# Patient Record
Sex: MALE | Race: WHITE
[De-identification: names, ages, dates, MRNs, and addresses within clinical notes are randomized per-mention and may not be internally consistent; named-entity substitution may affect disease eponyms.]

---

## 2021-04-12 LAB
ALBUMIN SERPL BCP-MCNC: 4 G/DL (ref 3.4–5)
ALBUMIN/GLOB SERPL: 1.4 {RATIO} (ref 1.1–1.5)
ALP SERPL-CCNC: 77 IU/L (ref 46–116)
ALT SERPL W P-5'-P-CCNC: 26 U/L (ref 30–65)
ANION GAP SERPL CALCULATED.3IONS-SCNC: 8 MMOL/L (ref 8–16)
AST SERPL W P-5'-P-CCNC: 19 U/L (ref 10–37)
BASOPHILS # BLD AUTO: 0 X10'3 (ref 0–0.2)
BASOPHILS NFR BLD AUTO: 0.3 % (ref 0–1)
BILIRUB SERPL-MCNC: 0.7 MG/DL (ref 0–1)
BUN SERPL-MCNC: 30 MG/DL (ref 7–18)
BUN/CREAT SERPL: 18.2 (ref 5.4–32)
CALCIUM SERPL-MCNC: 8.8 MG/DL (ref 8.5–10.1)
CHLORIDE SERPL-SCNC: 106 MMOL/L (ref 99–107)
CO2 SERPL-SCNC: 24.6 MMOL/L (ref 24–32)
CREAT SERPL-MCNC: 1.65 MG/DL (ref 0.6–1.1)
EOSINOPHIL # BLD AUTO: 0.4 X10'3 (ref 0–0.9)
EOSINOPHIL NFR BLD AUTO: 6.6 % (ref 0–6)
ERYTHROCYTE [DISTWIDTH] IN BLOOD BY AUTOMATED COUNT: 14.5 % (ref 11.5–14.5)
GFR SERPL CREATININE-BSD FRML MDRD: 41 ML/MIN
GLUCOSE SERPL-MCNC: 141 MG/DL (ref 70–104)
HCT VFR BLD AUTO: 43.1 % (ref 42–52)
HGB BLD-MCNC: 14.4 G/DL (ref 14–17.9)
LYMPHOCYTES # BLD AUTO: 0.5 X10'3 (ref 1.1–4.8)
LYMPHOCYTES NFR BLD AUTO: 7.7 % (ref 21–51)
MCH RBC QN AUTO: 31.5 PG (ref 27–31)
MCHC RBC AUTO-ENTMCNC: 33.4 G/DL (ref 33–36.5)
MCV RBC AUTO: 94.3 FL (ref 78–98)
MONOCYTES # BLD AUTO: 0.5 X10'3 (ref 0–0.9)
MONOCYTES NFR BLD AUTO: 7.4 % (ref 2–12)
NEUTROPHILS # BLD AUTO: 5.2 X10'3 (ref 1.8–7.7)
NEUTROPHILS NFR BLD AUTO: 78 % (ref 42–75)
PLATELET # BLD AUTO: 131 X10'3 (ref 140–440)
PMV BLD AUTO: 7.9 FL (ref 7.4–10.4)
POTASSIUM SERPL-SCNC: 4.8 MMOL/L (ref 3.4–5.1)
PROT SERPL-MCNC: 6.9 G/DL (ref 6.4–8.2)
RBC # BLD AUTO: 4.57 X10'6 (ref 4.7–6.1)
SODIUM SERPL-SCNC: 139 MMOL/L (ref 135–145)

## 2021-04-19 ENCOUNTER — HOSPITAL ENCOUNTER (OUTPATIENT)
Dept: HOSPITAL 94 - PAS | Age: 76
Discharge: HOME | End: 2021-04-19
Attending: ORTHOPAEDIC SURGERY
Payer: MEDICARE

## 2021-04-19 VITALS — SYSTOLIC BLOOD PRESSURE: 140 MMHG | DIASTOLIC BLOOD PRESSURE: 91 MMHG

## 2021-04-19 VITALS — DIASTOLIC BLOOD PRESSURE: 82 MMHG | SYSTOLIC BLOOD PRESSURE: 126 MMHG

## 2021-04-19 VITALS — SYSTOLIC BLOOD PRESSURE: 109 MMHG | DIASTOLIC BLOOD PRESSURE: 79 MMHG

## 2021-04-19 VITALS — BODY MASS INDEX: 24.81 KG/M2 | WEIGHT: 177.25 LBS | HEIGHT: 71 IN

## 2021-04-19 VITALS — SYSTOLIC BLOOD PRESSURE: 136 MMHG | DIASTOLIC BLOOD PRESSURE: 90 MMHG

## 2021-04-19 VITALS — SYSTOLIC BLOOD PRESSURE: 116 MMHG | DIASTOLIC BLOOD PRESSURE: 78 MMHG

## 2021-04-19 VITALS — DIASTOLIC BLOOD PRESSURE: 83 MMHG | SYSTOLIC BLOOD PRESSURE: 125 MMHG

## 2021-04-19 DIAGNOSIS — Z72.89: ICD-10-CM

## 2021-04-19 DIAGNOSIS — E78.00: ICD-10-CM

## 2021-04-19 DIAGNOSIS — Z79.82: ICD-10-CM

## 2021-04-19 DIAGNOSIS — Z95.2: ICD-10-CM

## 2021-04-19 DIAGNOSIS — G43.909: ICD-10-CM

## 2021-04-19 DIAGNOSIS — F32.9: ICD-10-CM

## 2021-04-19 DIAGNOSIS — Z20.822: ICD-10-CM

## 2021-04-19 DIAGNOSIS — K21.9: ICD-10-CM

## 2021-04-19 DIAGNOSIS — M67.432: ICD-10-CM

## 2021-04-19 DIAGNOSIS — G56.02: Primary | ICD-10-CM

## 2021-04-19 DIAGNOSIS — Z98.890: ICD-10-CM

## 2021-04-19 DIAGNOSIS — Z79.899: ICD-10-CM

## 2021-04-19 DIAGNOSIS — N40.0: ICD-10-CM

## 2021-04-19 DIAGNOSIS — Z85.46: ICD-10-CM

## 2021-04-19 PROCEDURE — 93005 ELECTROCARDIOGRAM TRACING: CPT

## 2021-04-19 PROCEDURE — 82948 REAGENT STRIP/BLOOD GLUCOSE: CPT

## 2021-04-19 PROCEDURE — 85025 COMPLETE CBC W/AUTO DIFF WBC: CPT

## 2021-04-19 PROCEDURE — 80053 COMPREHEN METABOLIC PANEL: CPT

## 2021-04-19 PROCEDURE — 64721 CARPAL TUNNEL SURGERY: CPT

## 2021-04-19 PROCEDURE — 25111 REMOVE WRIST TENDON LESION: CPT

## 2021-04-19 PROCEDURE — 36415 COLL VENOUS BLD VENIPUNCTURE: CPT

## 2021-04-19 NOTE — NUR
Received from OR via [facility] IN STABLE CONDITION, accompanied by Anesthesiologist  and OR RN 
report given by Cheryl. 

-------------------------------------------------------------------------------

Addendum: 04/19/21 at 1041 by Luci Navarro RN

-------------------------------------------------------------------------------

Amended: Links added.

## 2021-04-19 NOTE — NUR
PATIENT DISCHARGED HOME IN STABLE CONDITION VIA WHEELCHAIR AFTER WRITTEN AND VERBAL 
DISCHARGE INSTRUCTIONS GIVEN.  PATIENT GAVE VERBAL UNDERSTANDING OF INSTRUCTIONS GIVEN.  
PATIENT RECEIVED VALUABLES FROM ADMITTING UPON DISCHARGE FROM THE HOSPITAL.

-------------------------------------------------------------------------------

Addendum: 04/19/21 at 1136 by Luci Navarro RN

-------------------------------------------------------------------------------

Amended: Links added.

## 2024-08-01 ENCOUNTER — HOSPITAL ENCOUNTER (EMERGENCY)
Dept: HOSPITAL 94 - ER | Age: 79
Discharge: HOME | End: 2024-08-01
Payer: MEDICARE

## 2024-08-01 VITALS
DIASTOLIC BLOOD PRESSURE: 68 MMHG | OXYGEN SATURATION: 98 % | RESPIRATION RATE: 16 BRPM | HEART RATE: 70 BPM | TEMPERATURE: 97.6 F | SYSTOLIC BLOOD PRESSURE: 130 MMHG

## 2024-08-01 VITALS — HEIGHT: 70 IN | WEIGHT: 152.78 LBS | BODY MASS INDEX: 21.87 KG/M2

## 2024-08-01 DIAGNOSIS — Z79.82: ICD-10-CM
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
## 2024-08-01 DIAGNOSIS — Z79.899: ICD-10-CM
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
## 2024-08-01 DIAGNOSIS — S90.852A: Primary | ICD-10-CM

## 2024-08-01 PROCEDURE — 99284 EMERGENCY DEPT VISIT MOD MDM: CPT

## 2024-08-01 RX ADMIN — LIDOCAINE HYDROCHLORIDE,EPINEPHRINE BITARTRATE ONE ML: 10; .01 INJECTION, SOLUTION INFILTRATION; PERINEURAL at 16:15
